# Patient Record
Sex: FEMALE | URBAN - METROPOLITAN AREA
[De-identification: names, ages, dates, MRNs, and addresses within clinical notes are randomized per-mention and may not be internally consistent; named-entity substitution may affect disease eponyms.]

---

## 2018-07-17 ENCOUNTER — APPOINTMENT (RX ONLY)
Dept: URBAN - METROPOLITAN AREA CLINIC 73 | Facility: CLINIC | Age: 33
Setting detail: DERMATOLOGY
End: 2018-07-17

## 2018-07-17 DIAGNOSIS — L30.9 DERMATITIS, UNSPECIFIED: ICD-10-CM

## 2018-07-17 PROBLEM — J30.1 ALLERGIC RHINITIS DUE TO POLLEN: Status: ACTIVE | Noted: 2018-07-17

## 2018-07-17 PROBLEM — F32.9 MAJOR DEPRESSIVE DISORDER, SINGLE EPISODE, UNSPECIFIED: Status: ACTIVE | Noted: 2018-07-17

## 2018-07-17 PROCEDURE — ? OTHER

## 2018-07-17 PROCEDURE — 99202 OFFICE O/P NEW SF 15 MIN: CPT

## 2018-07-17 PROCEDURE — ? COUNSELING

## 2018-07-17 PROCEDURE — ? TREATMENT REGIMEN

## 2018-07-17 ASSESSMENT — LOCATION ZONE DERM
LOCATION ZONE: LEG
LOCATION ZONE: TRUNK
LOCATION ZONE: ARM

## 2018-07-17 ASSESSMENT — PAIN INTENSITY VAS: HOW INTENSE IS YOUR PAIN 0 BEING NO PAIN, 10 BEING THE MOST SEVERE PAIN POSSIBLE?: NO PAIN

## 2018-07-17 ASSESSMENT — LOCATION SIMPLE DESCRIPTION DERM
LOCATION SIMPLE: LEFT UPPER ARM
LOCATION SIMPLE: RIGHT THIGH
LOCATION SIMPLE: ABDOMEN
LOCATION SIMPLE: LEFT KNEE
LOCATION SIMPLE: LEFT THIGH
LOCATION SIMPLE: RIGHT KNEE
LOCATION SIMPLE: UPPER BACK
LOCATION SIMPLE: RIGHT UPPER ARM

## 2018-07-17 ASSESSMENT — LOCATION DETAILED DESCRIPTION DERM
LOCATION DETAILED: LEFT ANTERIOR DISTAL UPPER ARM
LOCATION DETAILED: INFERIOR THORACIC SPINE
LOCATION DETAILED: LEFT ANTERIOR PROXIMAL THIGH
LOCATION DETAILED: LEFT KNEE
LOCATION DETAILED: RIGHT KNEE
LOCATION DETAILED: RIGHT ANTERIOR DISTAL UPPER ARM
LOCATION DETAILED: EPIGASTRIC SKIN
LOCATION DETAILED: RIGHT ANTERIOR PROXIMAL THIGH

## 2018-07-17 ASSESSMENT — SEVERITY ASSESSMENT: SEVERITY: MILD TO MODERATE

## 2018-07-17 NOTE — PROCEDURE: OTHER
Note Text (......Xxx Chief Complaint.): This diagnosis correlates with the
Detail Level: Zone
Other (Free Text): -Dermatitis unspecified possible drug rash/reaction to Zoloft \\n-Pt d/c Zoloft, switching to Wellbutrin tapering off Celexa now \\n-Pt reports bx performed by Dr. Silver’s office on 06/18 confirmed a drug reaction, will request records from Dr. Silver\\n-Recommended pt get north american standard patch testing with Dr. Ara Grant’s office, [facility], will call to see if they can get pt in before the end of the month - Pt’s insurance terminating \\n-D/w pt can consider true test if pt wants but strongly recommended North American standard \\n-Pt denies changes in personal care products \\n-D/w pt drug rashes are usually located on trunk and hives typically resolve within 24 hrs\\n-Will trial Topicort samples for now x2 weeks
Other (Free Text): Pt was to get patch testing to the [place] American Std series at [facility] but had to reschedule and now cannot get in until next month when her insurance will not be valid.  Came for patch testing today.  Explained we do not have the NA Std series and only have true test.  Referred to [facility] for NA std series.  I do not have access to the path report but if true drug eruption, not sure of validity of patch testing.  Has had other rxns to medication in past but no hx of reaction to any personal care products.

## 2018-07-17 NOTE — HPI: RASH
How Severe Is Your Rash?: moderate
Is This A New Presentation, Or A Follow-Up?: Rash
Additional History: Patient stated that she noticed hives all over her body 1 mo after starting Zoloft a couple months ago. Patient stated that she went to a Saturday clinic two separate times and was given a short prednisone course at each visit. Patient reports the prednisone helped redness and itching but the hives kept recurring. Pt went to [facility] for a second opinion and had a skin bx done 06/18 - confirmed drug rash/reaction. Patient was recommended to get patch testing done but [facility] could not get pt in until August. Patient wants patch testing done today.

## 2018-07-17 NOTE — PROCEDURE: TREATMENT REGIMEN
Samples Given: Topicort spray
Initiate Treatment: Topicort spray Apply to affected areas on body twice daily for 2 weeks
Detail Level: Zone